# Patient Record
Sex: FEMALE | Race: WHITE | Employment: FULL TIME | URBAN - METROPOLITAN AREA
[De-identification: names, ages, dates, MRNs, and addresses within clinical notes are randomized per-mention and may not be internally consistent; named-entity substitution may affect disease eponyms.]

---

## 2023-12-29 ENCOUNTER — OFFICE VISIT (OUTPATIENT)
Dept: PRIMARY CARE | Facility: CLINIC | Age: 58
End: 2023-12-29

## 2023-12-29 VITALS — TEMPERATURE: 96.8 F | HEIGHT: 64 IN | BODY MASS INDEX: 42.34 KG/M2 | WEIGHT: 248 LBS

## 2023-12-29 DIAGNOSIS — R10.9 FLANK PAIN, ACUTE: ICD-10-CM

## 2023-12-29 DIAGNOSIS — I10 BENIGN ESSENTIAL HYPERTENSION: ICD-10-CM

## 2023-12-29 DIAGNOSIS — R01.1 HEART MURMUR: ICD-10-CM

## 2023-12-29 DIAGNOSIS — R35.0 URINARY FREQUENCY: Primary | ICD-10-CM

## 2023-12-29 PROBLEM — M17.0 PRIMARY OSTEOARTHRITIS OF BOTH KNEES: Status: ACTIVE | Noted: 2023-12-29

## 2023-12-29 LAB
APPEARANCE UR: CLEAR
BILIRUB UR QL STRIP: NEGATIVE
COLOR UR: YELLOW
GLUCOSE UR STRIP-MCNC: NEGATIVE MG/DL
HGB UR QL STRIP: ABNORMAL
KETONES UR STRIP-MCNC: ABNORMAL MG/DL
LEUKOCYTE ESTERASE UR QL STRIP: NEGATIVE
NITRITE UR QL STRIP: NEGATIVE
PH UR STRIP: 5 [PH]
PROT UR STRIP-MCNC: NEGATIVE MG/DL
SP GR UR STRIP.AUTO: >=1.03
UROBILINOGEN UR STRIP-ACNC: 0.2 E.U./DL

## 2023-12-29 PROCEDURE — 81003 URINALYSIS AUTO W/O SCOPE: CPT | Performed by: PHYSICIAN ASSISTANT

## 2023-12-29 PROCEDURE — 1036F TOBACCO NON-USER: CPT | Performed by: PHYSICIAN ASSISTANT

## 2023-12-29 PROCEDURE — 99203 OFFICE O/P NEW LOW 30 MIN: CPT | Performed by: PHYSICIAN ASSISTANT

## 2023-12-29 ASSESSMENT — ENCOUNTER SYMPTOMS
OCCASIONAL FEELINGS OF UNSTEADINESS: 0
NUMBNESS: 0
SHORTNESS OF BREATH: 0
WHEEZING: 0
LOSS OF SENSATION IN FEET: 0
ABDOMINAL PAIN: 0
FEVER: 0
ARTHRALGIAS: 0
COLOR CHANGE: 0
FREQUENCY: 0
SORE THROAT: 0
NERVOUS/ANXIOUS: 0
POLYDIPSIA: 0
WEAKNESS: 0
EYE DISCHARGE: 0
ANAL BLEEDING: 0
HEMATURIA: 0
SLEEP DISTURBANCE: 0
VOMITING: 0
FACIAL SWELLING: 0
NAUSEA: 0
DIARRHEA: 0
CHOKING: 0
DIFFICULTY URINATING: 0
EYE PAIN: 0
PALPITATIONS: 0
DIZZINESS: 0
ABDOMINAL DISTENTION: 0
CHEST TIGHTNESS: 0
HEADACHES: 0
JOINT SWELLING: 0
COUGH: 0
FATIGUE: 0
TREMORS: 0
CHILLS: 0
CONSTIPATION: 0
POLYPHAGIA: 0
APPETITE CHANGE: 0
MYALGIAS: 0
CONFUSION: 0
DEPRESSION: 0

## 2023-12-29 ASSESSMENT — PATIENT HEALTH QUESTIONNAIRE - PHQ9
SUM OF ALL RESPONSES TO PHQ9 QUESTIONS 1 AND 2: 0
1. LITTLE INTEREST OR PLEASURE IN DOING THINGS: NOT AT ALL
2. FEELING DOWN, DEPRESSED OR HOPELESS: NOT AT ALL

## 2023-12-29 NOTE — PROGRESS NOTES
Subjective   Patient ID: Meliza Vincent is a 58 y.o. female with a history of hypertension, right knee replacement in 2017, chronic left knee pain due to osteoarthritis in [facility] who presents for Establish Care.    HPI the patient is presented to become established as a new patient with a primary care provider.  She is complaining of right flank pain which she developed a week ago. The pain is constant and nagging.  She took Advil and Tylenol without any improvement.  Stated she felt better after taking Cyston.  She denies nausea, vomiting, fever, chills, urinary urgency, hematuria, pelvic pain or discomfort.  Stated that she has never had kidney stones in the past.  The patient has a history of hypertension for which she takes amlodipine 10 mg daily; however, she stopped taking it 3 days ago.  Stated it usually runs in the 140/90 mmHg with medication.  She denies shortness of breath or chest pain.  There is no leg edema.  Denies headaches or dizziness.    Review of Systems   Constitutional:  Negative for appetite change, chills, fatigue and fever.   HENT:  Negative for congestion, ear pain, facial swelling, hearing loss, nosebleeds and sore throat.    Eyes:  Negative for pain, discharge and visual disturbance.   Respiratory:  Negative for cough, choking, chest tightness, shortness of breath and wheezing.    Cardiovascular:  Negative for chest pain, palpitations and leg swelling.   Gastrointestinal:  Negative for abdominal distention, abdominal pain, anal bleeding, constipation, diarrhea, nausea and vomiting.   Endocrine: Negative for cold intolerance, heat intolerance, polydipsia, polyphagia and polyuria.   Genitourinary:  Negative for difficulty urinating, frequency, hematuria and urgency.        Right flank pain   Musculoskeletal:  Negative for arthralgias, gait problem, joint swelling and myalgias.   Skin:  Negative for color change and rash.   Neurological:  Negative for dizziness, tremors, syncope, weakness,  "numbness and headaches.   Psychiatric/Behavioral:  Negative for behavioral problems, confusion, sleep disturbance and suicidal ideas. The patient is not nervous/anxious.        Objective   Temp 36 °C (96.8 °F)   Ht 1.626 m (5' 4\")   Wt 112 kg (248 lb)   BMI 42.57 kg/m²     Physical Exam  Constitutional:       General: She is not in acute distress.     Appearance: Normal appearance.   HENT:      Head: Normocephalic and atraumatic.      Nose: Nose normal.   Eyes:      Extraocular Movements: Extraocular movements intact.      Conjunctiva/sclera: Conjunctivae normal.      Pupils: Pupils are equal, round, and reactive to light.   Cardiovascular:      Rate and Rhythm: Normal rate and regular rhythm.      Pulses: Normal pulses.      Heart sounds: Murmur heard.   Pulmonary:      Effort: Pulmonary effort is normal.      Breath sounds: Normal breath sounds.   Abdominal:      General: Bowel sounds are normal.      Palpations: Abdomen is soft.   Musculoskeletal:         General: Normal range of motion.      Cervical back: Normal range of motion and neck supple.   Neurological:      General: No focal deficit present.      Mental Status: She is alert and oriented to person, place, and time.   Psychiatric:         Mood and Affect: Mood normal.         Behavior: Behavior normal.         Thought Content: Thought content normal.         Judgment: Judgment normal.         Assessment/Plan     Right flank pain  Most likely passed kidney stone  Urinalysis is positive for trace of blood  Continue taking cyston  Ultrasound kidney recommended, declined, the patient is self-pay    High blood pressure   Blood pressure not well controlled  No added salt diet, do not add salt to your food  Try to exercise every other day for 30 minutes  Resume amlodipine 10 mg daily    Osteoarthritis of knees bilaterally  right knee replacement in 2017  Will refer the patient to orthopedic surgery once she gets medical insurance    Heart murmur, new to " patient  Most likely due to aortic stenosis  Echocardiogram recommended, patient declined for now  She will wait until she gets medical insurance    The patient will follow-up when she gets medical insurance

## 2024-05-28 DIAGNOSIS — R01.1 HEART MURMUR: Primary | ICD-10-CM

## 2025-04-02 ENCOUNTER — OFFICE VISIT (OUTPATIENT)
Dept: PRIMARY CARE | Facility: CLINIC | Age: 60
End: 2025-04-02

## 2025-04-02 VITALS
HEIGHT: 64 IN | DIASTOLIC BLOOD PRESSURE: 90 MMHG | BODY MASS INDEX: 45.07 KG/M2 | WEIGHT: 264 LBS | SYSTOLIC BLOOD PRESSURE: 150 MMHG | TEMPERATURE: 97.5 F

## 2025-04-02 DIAGNOSIS — R06.02 SOB (SHORTNESS OF BREATH): ICD-10-CM

## 2025-04-02 DIAGNOSIS — R01.1 HEART MURMUR: ICD-10-CM

## 2025-04-02 DIAGNOSIS — I10 BENIGN ESSENTIAL HYPERTENSION: Primary | ICD-10-CM

## 2025-04-02 PROCEDURE — 1036F TOBACCO NON-USER: CPT | Performed by: PHYSICIAN ASSISTANT

## 2025-04-02 PROCEDURE — 3008F BODY MASS INDEX DOCD: CPT | Performed by: PHYSICIAN ASSISTANT

## 2025-04-02 PROCEDURE — 99212 OFFICE O/P EST SF 10 MIN: CPT | Performed by: PHYSICIAN ASSISTANT

## 2025-04-02 PROCEDURE — 3080F DIAST BP >= 90 MM HG: CPT | Performed by: PHYSICIAN ASSISTANT

## 2025-04-02 PROCEDURE — 3077F SYST BP >= 140 MM HG: CPT | Performed by: PHYSICIAN ASSISTANT

## 2025-04-02 RX ORDER — AMLODIPINE BESYLATE 10 MG/1
10 TABLET ORAL DAILY
Start: 2025-04-02 | End: 2025-09-29

## 2025-04-02 RX ORDER — LISINOPRIL AND HYDROCHLOROTHIAZIDE 12.5; 2 MG/1; MG/1
1 TABLET ORAL DAILY
Qty: 30 TABLET | Refills: 6 | Status: SHIPPED | OUTPATIENT
Start: 2025-04-02 | End: 2025-10-29

## 2025-04-02 ASSESSMENT — ENCOUNTER SYMPTOMS
SORE THROAT: 0
NAUSEA: 0
PALPITATIONS: 0
NUMBNESS: 0
POLYDIPSIA: 0
ABDOMINAL PAIN: 0
CHILLS: 0
SHORTNESS OF BREATH: 0
MYALGIAS: 0
TREMORS: 0
WEAKNESS: 0
EYE PAIN: 0
DIZZINESS: 0
DIFFICULTY URINATING: 0
ARTHRALGIAS: 0
FACIAL SWELLING: 0
CHEST TIGHTNESS: 0
JOINT SWELLING: 0
EYE DISCHARGE: 0
NERVOUS/ANXIOUS: 0
COUGH: 0
ABDOMINAL DISTENTION: 0
VOMITING: 0
COLOR CHANGE: 0
POLYPHAGIA: 0
CONSTIPATION: 0
WHEEZING: 0
ANAL BLEEDING: 0
FREQUENCY: 0
CONFUSION: 0
APPETITE CHANGE: 0
FATIGUE: 0
HEMATURIA: 0
FEVER: 0
SLEEP DISTURBANCE: 0
HEADACHES: 0
BACK PAIN: 1
DIARRHEA: 0
CHOKING: 0

## 2025-04-02 ASSESSMENT — PAIN SCALES - GENERAL: PAINLEVEL_OUTOF10: 0-NO PAIN

## 2025-04-02 NOTE — PROGRESS NOTES
Subjective   Patient ID: Meliza Vincent is a 59 y.o. female with a history of hypertension, right knee replacement in 2017, chronic left knee pain due to osteoarthritis in [facility] who presents for Follow-up.    HPI the patient is presented for follow up.  She has not been seen for a year.  She occasionally has shortness of breath on exertion and occasional leg edema.  She feels like she is wheezing and has some upper back pain on the right side.  She denies difficulty breathing.  Her blood pressure runs in between 140-150/90 mmHg.  Per patient, she takes Lisinopril-hydrochlorothiazide 10-12.5 mg in addition to amlodipine which she failed to mention at the initial visit.   The patient is self-pay and declined to be seen by cardiology, have echocardiogram or blood work.    Review of Systems   Constitutional:  Negative for appetite change, chills, fatigue and fever.   HENT:  Negative for congestion, ear pain, facial swelling, hearing loss, nosebleeds and sore throat.    Eyes:  Negative for pain, discharge and visual disturbance.   Respiratory:  Negative for cough, choking, chest tightness, shortness of breath and wheezing.    Cardiovascular:  Positive for leg swelling. Negative for chest pain and palpitations.   Gastrointestinal:  Negative for abdominal distention, abdominal pain, anal bleeding, constipation, diarrhea, nausea and vomiting.   Endocrine: Negative for cold intolerance, heat intolerance, polydipsia, polyphagia and polyuria.   Genitourinary:  Negative for difficulty urinating, frequency, hematuria and urgency.   Musculoskeletal:  Positive for back pain. Negative for arthralgias, gait problem, joint swelling and myalgias.   Skin:  Negative for color change and rash.   Neurological:  Negative for dizziness, tremors, syncope, weakness, numbness and headaches.   Psychiatric/Behavioral:  Negative for behavioral problems, confusion, sleep disturbance and suicidal ideas. The patient is not nervous/anxious.   "      Objective   /90 (Patient Position: Sitting)   Temp 36.4 °C (97.5 °F) (Temporal)   Ht 1.626 m (5' 4\")   Wt 120 kg (264 lb)   Breastfeeding No   BMI 45.32 kg/m²     Physical Exam  Constitutional:       General: She is not in acute distress.     Appearance: Normal appearance.   HENT:      Head: Normocephalic and atraumatic.      Nose: Nose normal.   Eyes:      Extraocular Movements: Extraocular movements intact.      Conjunctiva/sclera: Conjunctivae normal.      Pupils: Pupils are equal, round, and reactive to light.   Cardiovascular:      Rate and Rhythm: Normal rate and regular rhythm.      Pulses: Normal pulses.      Heart sounds: Murmur heard.   Pulmonary:      Effort: Pulmonary effort is normal.      Breath sounds: Normal breath sounds.   Abdominal:      General: Bowel sounds are normal.      Palpations: Abdomen is soft.   Musculoskeletal:         General: Normal range of motion.      Cervical back: Normal range of motion and neck supple.      Comments: +1 pitting edema of legs bilaterally   Neurological:      General: No focal deficit present.      Mental Status: She is alert and oriented to person, place, and time.   Psychiatric:         Mood and Affect: Mood normal.         Behavior: Behavior normal.         Thought Content: Thought content normal.         Judgment: Judgment normal.         Assessment/Plan   Heart murmur, leg edema  Most likely due to aortic stenosis and CHF  The patient was strongly advised to have echocardiogram, consult cardiology and have x-ray chest  She agreed to have x-ray chest and declined echo and cardiology  Control blood pressure  Increase lisinopril-HCTZ to 20-12.5 mg once daily  Continue amlodipine 10 mg daily  Monitor blood pressure at home    Hypertension.  Not well controlled  Currently on lisinopril-HCTZ 10-12.5 mg and amlodipine 10 mg daily  Increase lisinopril-HCTZ to 20-12.5 mg once daily  Continue amlodipine 10 mg daily  No added salt diet, do not add salt " to your food  Try to exercise every other day for 30 minutes    Osteoarthritis of knees bilaterally  S/p right knee replacement in 2017  Will refer the patient to orthopedic surgery once she gets medical insurance    Follow-up in 1 month      Is This A New Presentation, Or A Follow-Up?: Rash Additional History: Patient states gone now but her primary care doctor diagnosed her with eczema.  Patient states she was given a steroid cream and it cleared it.

## 2025-04-07 ENCOUNTER — HOSPITAL ENCOUNTER (OUTPATIENT)
Dept: RADIOLOGY | Facility: CLINIC | Age: 60
Discharge: HOME | End: 2025-04-07

## 2025-04-07 DIAGNOSIS — I10 BENIGN ESSENTIAL HYPERTENSION: ICD-10-CM

## 2025-04-07 PROCEDURE — 75571 CT HRT W/O DYE W/CA TEST: CPT

## 2025-07-08 DIAGNOSIS — Z12.31 ENCOUNTER FOR SCREENING MAMMOGRAM FOR BREAST CANCER: ICD-10-CM
